# Patient Record
Sex: FEMALE | Race: WHITE | NOT HISPANIC OR LATINO | Employment: OTHER | URBAN - METROPOLITAN AREA
[De-identification: names, ages, dates, MRNs, and addresses within clinical notes are randomized per-mention and may not be internally consistent; named-entity substitution may affect disease eponyms.]

---

## 2020-05-21 ENCOUNTER — HOSPITAL ENCOUNTER (OUTPATIENT)
Facility: HOSPITAL | Age: 80
Setting detail: OBSERVATION
Discharge: HOME | End: 2020-05-22
Attending: INTERNAL MEDICINE | Admitting: INTERNAL MEDICINE
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
## 2020-05-21 PROBLEM — I10 ESSENTIAL HYPERTENSION: Status: ACTIVE | Noted: 2020-05-21

## 2020-05-21 PROBLEM — R94.39 ABNORMAL NUCLEAR STRESS TEST: Status: ACTIVE | Noted: 2020-05-21

## 2020-05-21 PROBLEM — F03.90 DEMENTIA (HCC): Status: ACTIVE | Noted: 2020-05-21

## 2020-05-21 PROBLEM — Z86.73 HISTORY OF CVA (CEREBROVASCULAR ACCIDENT): Status: ACTIVE | Noted: 2020-05-21

## 2020-05-21 PROBLEM — Z72.0 TOBACCO ABUSE: Status: ACTIVE | Noted: 2020-05-21

## 2020-05-21 PROBLEM — E78.5 HYPERLIPIDEMIA: Status: ACTIVE | Noted: 2020-05-21

## 2020-05-21 PROBLEM — N18.30 CKD (CHRONIC KIDNEY DISEASE) STAGE 3, GFR 30-59 ML/MIN (HCC): Status: ACTIVE | Noted: 2020-05-21

## 2020-05-21 PROBLEM — K21.9 GERD (GASTROESOPHAGEAL REFLUX DISEASE): Status: ACTIVE | Noted: 2020-05-21

## 2020-05-21 PROBLEM — I95.1 ORTHOSTASIS: Status: ACTIVE | Noted: 2020-05-21

## 2020-05-21 PROBLEM — I25.10 CAD (CORONARY ARTERY DISEASE): Status: ACTIVE | Noted: 2020-05-21

## 2020-05-21 LAB
ALBUMIN SERPL-MCNC: 3.9 G/DL (ref 3.5–5.2)
ALBUMIN/GLOB SERPL: 1.7 G/DL
ALP SERPL-CCNC: 58 U/L (ref 39–117)
ALT SERPL W P-5'-P-CCNC: 8 U/L (ref 1–33)
ANION GAP SERPL CALCULATED.3IONS-SCNC: 14 MMOL/L (ref 5–15)
AST SERPL-CCNC: 12 U/L (ref 1–32)
BASOPHILS # BLD AUTO: 0.04 10*3/MM3 (ref 0–0.2)
BASOPHILS NFR BLD AUTO: 0.5 % (ref 0–1.5)
BILIRUB SERPL-MCNC: 0.2 MG/DL (ref 0.2–1.2)
BUN BLD-MCNC: 27 MG/DL (ref 8–23)
BUN/CREAT SERPL: 18.6 (ref 7–25)
CALCIUM SPEC-SCNC: 8.8 MG/DL (ref 8.6–10.5)
CHLORIDE SERPL-SCNC: 102 MMOL/L (ref 98–107)
CO2 SERPL-SCNC: 22 MMOL/L (ref 22–29)
CREAT BLD-MCNC: 1.45 MG/DL (ref 0.57–1)
DEPRECATED RDW RBC AUTO: 43.9 FL (ref 37–54)
EOSINOPHIL # BLD AUTO: 0.2 10*3/MM3 (ref 0–0.4)
EOSINOPHIL NFR BLD AUTO: 2.4 % (ref 0.3–6.2)
ERYTHROCYTE [DISTWIDTH] IN BLOOD BY AUTOMATED COUNT: 13.2 % (ref 12.3–15.4)
GFR SERPL CREATININE-BSD FRML MDRD: 35 ML/MIN/1.73
GLOBULIN UR ELPH-MCNC: 2.3 GM/DL
GLUCOSE BLD-MCNC: 81 MG/DL (ref 65–99)
HCT VFR BLD AUTO: 36.8 % (ref 34–46.6)
HGB BLD-MCNC: 11.7 G/DL (ref 12–15.9)
IMM GRANULOCYTES # BLD AUTO: 0.03 10*3/MM3 (ref 0–0.05)
IMM GRANULOCYTES NFR BLD AUTO: 0.4 % (ref 0–0.5)
LYMPHOCYTES # BLD AUTO: 1.57 10*3/MM3 (ref 0.7–3.1)
LYMPHOCYTES NFR BLD AUTO: 19.2 % (ref 19.6–45.3)
MCH RBC QN AUTO: 28.9 PG (ref 26.6–33)
MCHC RBC AUTO-ENTMCNC: 31.8 G/DL (ref 31.5–35.7)
MCV RBC AUTO: 90.9 FL (ref 79–97)
MONOCYTES # BLD AUTO: 0.63 10*3/MM3 (ref 0.1–0.9)
MONOCYTES NFR BLD AUTO: 7.7 % (ref 5–12)
NEUTROPHILS # BLD AUTO: 5.72 10*3/MM3 (ref 1.7–7)
NEUTROPHILS NFR BLD AUTO: 69.8 % (ref 42.7–76)
NRBC BLD AUTO-RTO: 0 /100 WBC (ref 0–0.2)
PLATELET # BLD AUTO: 264 10*3/MM3 (ref 140–450)
PMV BLD AUTO: 9.7 FL (ref 6–12)
POTASSIUM BLD-SCNC: 4.3 MMOL/L (ref 3.5–5.2)
PROT SERPL-MCNC: 6.2 G/DL (ref 6–8.5)
RBC # BLD AUTO: 4.05 10*6/MM3 (ref 3.77–5.28)
SARS-COV-2 RNA RESP QL NAA+PROBE: NOT DETECTED
SODIUM BLD-SCNC: 138 MMOL/L (ref 136–145)
WBC NRBC COR # BLD: 8.19 10*3/MM3 (ref 3.4–10.8)

## 2020-05-21 PROCEDURE — 63710000001 SENNOSIDES-DOCUSATE 8.6-50 MG TABLET: Performed by: PHYSICIAN ASSISTANT

## 2020-05-21 PROCEDURE — 63710000001 ATORVASTATIN 40 MG TABLET: Performed by: PHYSICIAN ASSISTANT

## 2020-05-21 PROCEDURE — 94799 UNLISTED PULMONARY SVC/PX: CPT

## 2020-05-21 PROCEDURE — 25010000002 ENOXAPARIN PER 10 MG: Performed by: PHYSICIAN ASSISTANT

## 2020-05-21 PROCEDURE — 94640 AIRWAY INHALATION TREATMENT: CPT

## 2020-05-21 PROCEDURE — A9270 NON-COVERED ITEM OR SERVICE: HCPCS | Performed by: PHYSICIAN ASSISTANT

## 2020-05-21 PROCEDURE — G0378 HOSPITAL OBSERVATION PER HR: HCPCS

## 2020-05-21 PROCEDURE — 85025 COMPLETE CBC W/AUTO DIFF WBC: CPT | Performed by: PHYSICIAN ASSISTANT

## 2020-05-21 PROCEDURE — 96372 THER/PROPH/DIAG INJ SC/IM: CPT

## 2020-05-21 PROCEDURE — 96360 HYDRATION IV INFUSION INIT: CPT

## 2020-05-21 PROCEDURE — 63710000001 MONTELUKAST 10 MG TABLET: Performed by: PHYSICIAN ASSISTANT

## 2020-05-21 PROCEDURE — 63710000001 PANTOPRAZOLE 40 MG TABLET DELAYED-RELEASE: Performed by: PHYSICIAN ASSISTANT

## 2020-05-21 PROCEDURE — 63710000001 ASPIRIN 81 MG CHEWABLE TABLET: Performed by: PHYSICIAN ASSISTANT

## 2020-05-21 PROCEDURE — 96361 HYDRATE IV INFUSION ADD-ON: CPT

## 2020-05-21 PROCEDURE — 87635 SARS-COV-2 COVID-19 AMP PRB: CPT | Performed by: INTERNAL MEDICINE

## 2020-05-21 PROCEDURE — 63710000001 DOCUSATE SODIUM 100 MG CAPSULE: Performed by: PHYSICIAN ASSISTANT

## 2020-05-21 PROCEDURE — 80053 COMPREHEN METABOLIC PANEL: CPT | Performed by: PHYSICIAN ASSISTANT

## 2020-05-21 PROCEDURE — 99220 PR INITIAL OBSERVATION CARE/DAY 70 MINUTES: CPT | Performed by: INTERNAL MEDICINE

## 2020-05-21 PROCEDURE — 63710000001 ISOSORBIDE MONONITRATE 30 MG TABLET SUSTAINED-RELEASE 24 HOUR: Performed by: PHYSICIAN ASSISTANT

## 2020-05-21 PROCEDURE — C9803 HOPD COVID-19 SPEC COLLECT: HCPCS

## 2020-05-21 PROCEDURE — 63710000001 METOPROLOL TARTRATE 12.5 MG TABLET: Performed by: PHYSICIAN ASSISTANT

## 2020-05-21 PROCEDURE — 63710000001 OLANZAPINE 5 MG TABLET: Performed by: PHYSICIAN ASSISTANT

## 2020-05-21 PROCEDURE — 63710000001 BUDESONIDE-FORMOTEROL 160-4.5 MCG/ACT AEROSOL 6 G INHALER: Performed by: PHYSICIAN ASSISTANT

## 2020-05-21 PROCEDURE — 63710000001 ACETAMINOPHEN 325 MG TABLET: Performed by: PHYSICIAN ASSISTANT

## 2020-05-21 RX ORDER — NITROGLYCERIN 0.4 MG/1
0.4 TABLET SUBLINGUAL
Status: DISCONTINUED | OUTPATIENT
Start: 2020-05-21 | End: 2020-05-22 | Stop reason: HOSPADM

## 2020-05-21 RX ORDER — DOCUSATE CALCIUM 240 MG
240 CAPSULE ORAL 2 TIMES DAILY
COMMUNITY

## 2020-05-21 RX ORDER — SODIUM CHLORIDE 0.9 % (FLUSH) 0.9 %
10 SYRINGE (ML) INJECTION EVERY 12 HOURS SCHEDULED
Status: DISCONTINUED | OUTPATIENT
Start: 2020-05-21 | End: 2020-05-22 | Stop reason: HOSPADM

## 2020-05-21 RX ORDER — CETIRIZINE HYDROCHLORIDE 5 MG/1
5 TABLET ORAL DAILY
COMMUNITY

## 2020-05-21 RX ORDER — ASPIRIN 81 MG/1
324 TABLET, CHEWABLE ORAL ONCE
Status: COMPLETED | OUTPATIENT
Start: 2020-05-21 | End: 2020-05-21

## 2020-05-21 RX ORDER — ACETAMINOPHEN 160 MG/5ML
650 SOLUTION ORAL EVERY 4 HOURS PRN
Status: DISCONTINUED | OUTPATIENT
Start: 2020-05-21 | End: 2020-05-22 | Stop reason: HOSPADM

## 2020-05-21 RX ORDER — ONDANSETRON 2 MG/ML
4 INJECTION INTRAMUSCULAR; INTRAVENOUS EVERY 6 HOURS PRN
Status: DISCONTINUED | OUTPATIENT
Start: 2020-05-21 | End: 2020-05-22 | Stop reason: HOSPADM

## 2020-05-21 RX ORDER — FLUOXETINE HYDROCHLORIDE 20 MG/1
20 CAPSULE ORAL DAILY
Status: DISCONTINUED | OUTPATIENT
Start: 2020-05-22 | End: 2020-05-22 | Stop reason: HOSPADM

## 2020-05-21 RX ORDER — FAMOTIDINE 20 MG/1
20 TABLET, FILM COATED ORAL DAILY
Status: DISCONTINUED | OUTPATIENT
Start: 2020-05-22 | End: 2020-05-22 | Stop reason: HOSPADM

## 2020-05-21 RX ORDER — CLOPIDOGREL BISULFATE 75 MG/1
75 TABLET ORAL DAILY
Status: DISCONTINUED | OUTPATIENT
Start: 2020-05-21 | End: 2020-05-21

## 2020-05-21 RX ORDER — BUDESONIDE AND FORMOTEROL FUMARATE DIHYDRATE 160; 4.5 UG/1; UG/1
2 AEROSOL RESPIRATORY (INHALATION)
COMMUNITY

## 2020-05-21 RX ORDER — LISINOPRIL 5 MG/1
5 TABLET ORAL DAILY
Status: DISCONTINUED | OUTPATIENT
Start: 2020-05-21 | End: 2020-05-22 | Stop reason: HOSPADM

## 2020-05-21 RX ORDER — ASPIRIN 325 MG
325 TABLET ORAL DAILY
Status: DISCONTINUED | OUTPATIENT
Start: 2020-05-21 | End: 2020-05-21

## 2020-05-21 RX ORDER — FLUOXETINE HYDROCHLORIDE 60 MG/1
60 TABLET, FILM COATED ORAL; ORAL DAILY
COMMUNITY

## 2020-05-21 RX ORDER — LISINOPRIL 10 MG/1
10 TABLET ORAL DAILY
Status: ON HOLD | COMMUNITY
End: 2020-05-22 | Stop reason: SDUPTHER

## 2020-05-21 RX ORDER — CLOPIDOGREL BISULFATE 75 MG/1
75 TABLET ORAL DAILY
Status: DISCONTINUED | OUTPATIENT
Start: 2020-05-21 | End: 2020-05-22 | Stop reason: HOSPADM

## 2020-05-21 RX ORDER — ASPIRIN 81 MG/1
81 TABLET ORAL DAILY
Status: DISCONTINUED | OUTPATIENT
Start: 2020-05-22 | End: 2020-05-22 | Stop reason: HOSPADM

## 2020-05-21 RX ORDER — ALUMINA, MAGNESIA, AND SIMETHICONE 2400; 2400; 240 MG/30ML; MG/30ML; MG/30ML
15 SUSPENSION ORAL EVERY 6 HOURS PRN
Status: DISCONTINUED | OUTPATIENT
Start: 2020-05-21 | End: 2020-05-22 | Stop reason: HOSPADM

## 2020-05-21 RX ORDER — BUDESONIDE AND FORMOTEROL FUMARATE DIHYDRATE 160; 4.5 UG/1; UG/1
2 AEROSOL RESPIRATORY (INHALATION)
Status: DISCONTINUED | OUTPATIENT
Start: 2020-05-21 | End: 2020-05-22 | Stop reason: HOSPADM

## 2020-05-21 RX ORDER — FAMOTIDINE 10 MG
10 TABLET ORAL DAILY
Status: ON HOLD | COMMUNITY
End: 2020-05-21

## 2020-05-21 RX ORDER — ONDANSETRON 4 MG/1
4 TABLET, FILM COATED ORAL EVERY 8 HOURS PRN
Status: DISCONTINUED | OUTPATIENT
Start: 2020-05-21 | End: 2020-05-21 | Stop reason: SDUPTHER

## 2020-05-21 RX ORDER — LISINOPRIL 10 MG/1
10 TABLET ORAL DAILY
Status: DISCONTINUED | OUTPATIENT
Start: 2020-05-21 | End: 2020-05-21

## 2020-05-21 RX ORDER — MONTELUKAST SODIUM 10 MG/1
10 TABLET ORAL NIGHTLY
Status: DISCONTINUED | OUTPATIENT
Start: 2020-05-21 | End: 2020-05-22 | Stop reason: HOSPADM

## 2020-05-21 RX ORDER — CETIRIZINE HYDROCHLORIDE 10 MG/1
5 TABLET ORAL DAILY
Status: DISCONTINUED | OUTPATIENT
Start: 2020-05-22 | End: 2020-05-22 | Stop reason: HOSPADM

## 2020-05-21 RX ORDER — ASPIRIN 325 MG
325 TABLET ORAL DAILY
COMMUNITY
End: 2020-05-22 | Stop reason: HOSPADM

## 2020-05-21 RX ORDER — SODIUM CHLORIDE 9 MG/ML
100 INJECTION, SOLUTION INTRAVENOUS CONTINUOUS
Status: DISCONTINUED | OUTPATIENT
Start: 2020-05-21 | End: 2020-05-22 | Stop reason: HOSPADM

## 2020-05-21 RX ORDER — CLOPIDOGREL BISULFATE 75 MG/1
75 TABLET ORAL DAILY
COMMUNITY
End: 2022-11-15

## 2020-05-21 RX ORDER — ACETAMINOPHEN 325 MG/1
650 TABLET ORAL EVERY 4 HOURS PRN
Status: DISCONTINUED | OUTPATIENT
Start: 2020-05-21 | End: 2020-05-22 | Stop reason: HOSPADM

## 2020-05-21 RX ORDER — ONDANSETRON 4 MG/1
4 TABLET, FILM COATED ORAL EVERY 6 HOURS PRN
Status: DISCONTINUED | OUTPATIENT
Start: 2020-05-21 | End: 2020-05-22 | Stop reason: HOSPADM

## 2020-05-21 RX ORDER — SIMVASTATIN 40 MG
40 TABLET ORAL NIGHTLY
Status: ON HOLD | COMMUNITY
End: 2020-05-21

## 2020-05-21 RX ORDER — FUROSEMIDE 20 MG/1
20 TABLET ORAL DAILY
Status: ON HOLD | COMMUNITY
End: 2020-05-21

## 2020-05-21 RX ORDER — ISOSORBIDE MONONITRATE 30 MG/1
30 TABLET, EXTENDED RELEASE ORAL
Status: DISCONTINUED | OUTPATIENT
Start: 2020-05-21 | End: 2020-05-22 | Stop reason: HOSPADM

## 2020-05-21 RX ORDER — PANTOPRAZOLE SODIUM 40 MG/1
40 TABLET, DELAYED RELEASE ORAL
Status: DISCONTINUED | OUTPATIENT
Start: 2020-05-21 | End: 2020-05-22 | Stop reason: HOSPADM

## 2020-05-21 RX ORDER — FAMOTIDINE 20 MG/1
20 TABLET, FILM COATED ORAL DAILY
Status: DISCONTINUED | OUTPATIENT
Start: 2020-05-21 | End: 2020-05-21

## 2020-05-21 RX ORDER — AMOXICILLIN 250 MG
2 CAPSULE ORAL 2 TIMES DAILY PRN
Status: DISCONTINUED | OUTPATIENT
Start: 2020-05-21 | End: 2020-05-22 | Stop reason: HOSPADM

## 2020-05-21 RX ORDER — CHLORTHALIDONE 25 MG/1
12.5 TABLET ORAL DAILY
Status: ON HOLD | COMMUNITY
End: 2020-05-21

## 2020-05-21 RX ORDER — HYDROCODONE BITARTRATE AND ACETAMINOPHEN 5; 325 MG/1; MG/1
1 TABLET ORAL EVERY 8 HOURS PRN
Status: ON HOLD | COMMUNITY
End: 2020-05-21

## 2020-05-21 RX ORDER — ONDANSETRON 4 MG/1
4 TABLET, FILM COATED ORAL EVERY 8 HOURS PRN
COMMUNITY

## 2020-05-21 RX ORDER — PANTOPRAZOLE SODIUM 40 MG/1
40 TABLET, DELAYED RELEASE ORAL DAILY
Status: ON HOLD | COMMUNITY
End: 2020-05-21

## 2020-05-21 RX ORDER — MONTELUKAST SODIUM 10 MG/1
10 TABLET ORAL NIGHTLY
COMMUNITY

## 2020-05-21 RX ORDER — FAMOTIDINE 20 MG/1
40 TABLET, FILM COATED ORAL DAILY
Status: DISCONTINUED | OUTPATIENT
Start: 2020-05-21 | End: 2020-05-21

## 2020-05-21 RX ORDER — ACETAMINOPHEN 650 MG/1
650 SUPPOSITORY RECTAL EVERY 4 HOURS PRN
Status: DISCONTINUED | OUTPATIENT
Start: 2020-05-21 | End: 2020-05-22 | Stop reason: HOSPADM

## 2020-05-21 RX ORDER — SODIUM CHLORIDE 0.9 % (FLUSH) 0.9 %
10 SYRINGE (ML) INJECTION AS NEEDED
Status: DISCONTINUED | OUTPATIENT
Start: 2020-05-21 | End: 2020-05-22 | Stop reason: HOSPADM

## 2020-05-21 RX ORDER — OLANZAPINE 5 MG/1
10 TABLET ORAL NIGHTLY
COMMUNITY
End: 2022-04-19

## 2020-05-21 RX ORDER — DOCUSATE SODIUM 100 MG/1
100 CAPSULE, LIQUID FILLED ORAL 2 TIMES DAILY PRN
Status: DISCONTINUED | OUTPATIENT
Start: 2020-05-21 | End: 2020-05-22 | Stop reason: HOSPADM

## 2020-05-21 RX ORDER — FAMOTIDINE 20 MG/1
20 TABLET, FILM COATED ORAL DAILY
COMMUNITY
End: 2020-05-22 | Stop reason: HOSPADM

## 2020-05-21 RX ORDER — ATORVASTATIN CALCIUM 40 MG/1
80 TABLET, FILM COATED ORAL NIGHTLY
Status: DISCONTINUED | OUTPATIENT
Start: 2020-05-21 | End: 2020-05-22 | Stop reason: HOSPADM

## 2020-05-21 RX ORDER — OLANZAPINE 5 MG/1
5 TABLET ORAL NIGHTLY
Status: DISCONTINUED | OUTPATIENT
Start: 2020-05-21 | End: 2020-05-22 | Stop reason: HOSPADM

## 2020-05-21 RX ADMIN — ASPIRIN 81 MG 324 MG: 81 TABLET ORAL at 13:39

## 2020-05-21 RX ADMIN — IPRATROPIUM BROMIDE 0.5 MG: 0.5 SOLUTION RESPIRATORY (INHALATION) at 16:03

## 2020-05-21 RX ADMIN — BUDESONIDE AND FORMOTEROL FUMARATE DIHYDRATE 2 PUFF: 160; 4.5 AEROSOL RESPIRATORY (INHALATION) at 19:12

## 2020-05-21 RX ADMIN — SODIUM CHLORIDE 100 ML/HR: 9 INJECTION, SOLUTION INTRAVENOUS at 16:45

## 2020-05-21 RX ADMIN — SENNOSIDES AND DOCUSATE SODIUM 2 TABLET: 8.6; 5 TABLET ORAL at 20:35

## 2020-05-21 RX ADMIN — ATORVASTATIN CALCIUM 80 MG: 40 TABLET, FILM COATED ORAL at 20:35

## 2020-05-21 RX ADMIN — MONTELUKAST SODIUM 10 MG: 10 TABLET, FILM COATED ORAL at 20:36

## 2020-05-21 RX ADMIN — SODIUM CHLORIDE, PRESERVATIVE FREE 10 ML: 5 INJECTION INTRAVENOUS at 14:33

## 2020-05-21 RX ADMIN — OLANZAPINE 5 MG: 5 TABLET, FILM COATED ORAL at 20:36

## 2020-05-21 RX ADMIN — ENOXAPARIN SODIUM 40 MG: 40 INJECTION SUBCUTANEOUS at 13:39

## 2020-05-21 RX ADMIN — PANTOPRAZOLE SODIUM 40 MG: 40 TABLET, DELAYED RELEASE ORAL at 16:45

## 2020-05-21 RX ADMIN — ACETAMINOPHEN 650 MG: 325 TABLET, FILM COATED ORAL at 22:47

## 2020-05-21 RX ADMIN — METOPROLOL TARTRATE 12.5 MG: 25 TABLET, FILM COATED ORAL at 13:39

## 2020-05-21 RX ADMIN — SODIUM CHLORIDE 500 ML: 9 INJECTION, SOLUTION INTRAVENOUS at 14:30

## 2020-05-21 RX ADMIN — ISOSORBIDE MONONITRATE 30 MG: 30 TABLET, EXTENDED RELEASE ORAL at 13:39

## 2020-05-21 RX ADMIN — DOCUSATE SODIUM 100 MG: 100 CAPSULE, LIQUID FILLED ORAL at 20:35

## 2020-05-21 RX ADMIN — IPRATROPIUM BROMIDE 0.5 MG: 0.5 SOLUTION RESPIRATORY (INHALATION) at 19:11

## 2020-05-21 NOTE — PLAN OF CARE
Problem: Patient Care Overview  Goal: Plan of Care Review  Outcome: Ongoing (interventions implemented as appropriate)  Flowsheets (Taken 5/21/2020 1723)  Progress: no change  Plan of Care Reviewed With: patient  Outcome Summary: No c/o chest pain since Pt arrived to unit. NS infusing at 100. RA. NSR. VSS. Patient ambulated to bathroom with standby assist. Will continue to monitor.

## 2020-05-21 NOTE — H&P
[facility]        Date of Hospital Visit: 20      Place of Service: [facility]    Patient Name: Genoveva Velázquez  :1940    Referral Provider: Azeem Rivera MD  Primary Care Provider: Honey Newsome MD    Chief complaint/Reason for Consultation:  Coronary Artery Disease         Problem List:  Patient Active Problem List    Diagnosis    • Falls         • CAD (coronary artery disease)                · H/O stents  · Echo, 2020: Normal LV systolic function EF greater than 60%.  Normal valvular morphology.     • Abnormal nuclear stress test                · MPS 20: Small to medium sized, mild to moderately severe anterior wall defect possibly representing chronic, low risk ischemia in the LAD territory.  No new ischemia.  Normal LV systolic function.  No significant change or new abnormality compared with 3 previous nuclear stress test     • Orthostasis         • Essential hypertension         • Hyperlipidemia         • Tobacco abuse         • History of CVA (cerebrovascular accident)    • GERD (gastroesophageal reflux disease)    • CKD (chronic kidney disease) stage 3, GFR 30-59 ml/min (CMS/[facility])    • Dementia (CMS/[facility])                History of Present Illness:  This is an 79-year-old hypertensive dyslipidemic female smoker with known coronary artery disease with remote stent to unknown vessel.  She recently presented to [facility] emergency department with a complaint of 2 falls over the previous 3 days.  She denies alf syncope.  MI was excluded.  She had orthostatic blood pressures which were positive for orthostasis.  At the time of admission she was on Lasix 20 mg daily as well as chlorthalidone 25 mg half tablet daily.  Chlorthalidone was discontinued this past Tuesday.  She had an echocardiogram which was unremarkable and a myocardial perfusion study which was abnormal but unchanged from the previous 3 exams.  Last night she had a  "single episode of sharp chest pain lasting approximately 15 minutes which resolved without intervention but was associated with \"numbness of the mouth\" and pain in the left shoulder \"like a toothache\".  At the time of admission to [facility] she was on 2 separate diuretics 1 of which was discontinued.  She has stage III chronic kidney disease which is stable.  She denies orthopnea, PND, claudication, lower extremity edema.  She has no awareness of tachyarrhythmias, no dizziness or syncope.        History reviewed. No pertinent surgical history.    Allergies   Allergen Reactions   • Sulfa Antibiotics Unknown - High Severity     Per patient, mother told her not to take       No medications prior to admission.   Cardiac medications prior to admission:  1.  Lisinopril 10 mg daily  2.  Simvastatin 80 mg daily  3.  Plavix 75 mg daily  4.  Lasix 20 mg daily  5.  Imdur 30 mg daily  6.  Chlorthalidone 25 mg half tablet daily  7.  Nitroglycerin 0.4 sublingual as needed chest pain    Noncardiac medications prior to admission:  1.  Hydrocodone 5-3 25 1 tablet every 8 hours as needed  2. Pantoprazole 40 mg daily  3. Fluoxetine 40 mg daily  4. Albuterol 2 inhalations 6 hours as needed  5. Singulair 10 mg daily  6. Calcium 600 mg daily next poly-iron 150 mg twice daily  7. Cetirizine 10 mg daily make stool softener 1 daily   8. famotidine 20 mg daily    9.  Zofran 4 mg twice daily as needed   10. Symbicort 160-4.5 mg 2 inhalations twice daily next   11. olanzapine 10 mg at bedtime  12 Spiriva 18 mcg 1 inhalation elation daily    No current facility-administered medications for this encounter.       Social History     Socioeconomic History   • Marital status:      Spouse name: Not on file   • Number of children: Not on file   • Years of education: Not on file   • Highest education level: Not on file   Tobacco Use   • Smoking status: Current Every Day Smoker     Packs/day: 1.00     Types: Cigarettes   • Smokeless " "tobacco: Never Used   Substance and Sexual Activity   • Alcohol use: Never     Frequency: Never   • Drug use: Never   • Sexual activity: Defer       History reviewed. No pertinent family history.    REVIEW OF SYSTEMS:   Review of Systems   Constitution: Negative.   HENT: Negative.    Eyes: Negative.    Cardiovascular: Positive for chest pain.   Respiratory: Negative.    Endocrine: Negative.    Hematologic/Lymphatic: Negative.    Skin: Negative.    Musculoskeletal: Negative.    Gastrointestinal: Negative.    Genitourinary: Negative.    Neurological: Negative.    Psychiatric/Behavioral: Negative.    Allergic/Immunologic: Negative.    All other systems reviewed and are negative.           Objective:  Vitals:    05/21/20 1055 05/21/20 1100   BP: 109/62 119/81   BP Location: Left arm Right arm   Patient Position: Lying Lying   Pulse: 79 78   Resp: 18    Temp: 97.6 °F (36.4 °C)    TempSrc: Oral    SpO2: 93% 92%   Weight: 75.3 kg (166 lb)    Height: 162.6 cm (64\")      Body mass index is 28.49 kg/m².  Flowsheet Rows      First Filed Value   Admission Height  162.6 cm (64\") Documented at 05/21/2020 1055   Admission Weight  75.3 kg (166 lb) Documented at 05/21/2020 1055        No intake or output data in the 24 hours ending 05/21/20 1121    Physical Exam   Constitutional: She is oriented to person, place, and time. She appears well-developed and well-nourished. No distress.   HENT:   Head: Normocephalic and atraumatic.   Mouth/Throat: Oropharynx is clear and moist.   Eyes: Pupils are equal, round, and reactive to light. No scleral icterus.   Neck: Neck supple. No JVD present. No tracheal deviation present. No thyromegaly present.   Cardiovascular: Normal rate, regular rhythm and normal heart sounds. Exam reveals no gallop and no friction rub.   No murmur heard.  Pulmonary/Chest: Effort normal and breath sounds normal. No respiratory distress. She has no wheezes. She has no rales.   Abdominal: Soft. Bowel sounds are normal. " "She exhibits no distension and no mass. There is no tenderness. There is no rebound and no guarding.   Musculoskeletal: Normal range of motion. She exhibits no edema or deformity.   Lymphadenopathy:     She has no cervical adenopathy.   Neurological: She is alert and oriented to person, place, and time. No cranial nerve deficit.   Skin: Skin is warm and dry. No rash noted. She is not diaphoretic.   Psychiatric: She has a normal mood and affect.   Nursing note and vitals reviewed.                Lab Review: From [facility]:  CMP, 5/21/2020: Glu 106, BUN 28, scr 1.49, GFR 34, , K 4.2, chl 105, CO2 26, bili 0.3, alk phos 56, albumin 3.8, globulin 2.5, protein 6.3, ALT 11, AST 17, calcium 9  Troponin: 0.012×5  BNP: 70  CBC: WBCs 5.6, hemoglobin 12.1, hematocrit 35.3, platelets 254                           Assessment and Plan:     1.  Atypical chest pain, MI excluded  2.  Known coronary artery disease with abnormal myocardial perfusion study unchanged from baseline  3.  Falls most probably secondary to orthostasis due to overdiuresis  4.  Hypertension, well managed  5.  Dyslipidemia on high-dose statin  6.  Chronic kidney disease stage III, stable        Plan:  1.  Continue medical treatment  2.  Change Protonix to 40 mg twice daily  3.  Add Lopressor 12.5 mg twice daily  4.  Gentle volume replacement  5.  Diet       Electronically signed by MURIEL Gutiérrez, 05/21/20, 11:34 AM.    ___________________________________________________________  The patient was seen and examined by me.  Agree and verified/discussed key components of E/M as outlined by the Nurse practitioner/PA.    /81 (BP Location: Right arm, Patient Position: Lying)   Pulse 78   Temp 97.6 °F (36.4 °C) (Oral)   Resp 18   Ht 162.6 cm (64\")   Wt 75.3 kg (166 lb)   SpO2 92%   BMI 28.49 kg/m²     General Appearance:   · well developed  · well nourished  Neck:  · thyroid not enlarged  · supple  Respiratory:  · no " respiratory distress  · normal breath sounds  · no rales  Cardiovascular:  · no jugular venous distention  · regular rhythm  · apical impulse normal  · S1 normal, S2 normal  · no S3, no S4   · no murmur  · no rub, no thrill  · carotid pulses normal; no bruit  · pedal pulses normal  · lower extremity edema: none  .   Skin:   · warm, dry        Plan:    I had a long discussion with the patient regarding her chest pain and other symptoms.  Symptoms have mixed features of typical and atypical features  Negative troponin, no acute EKG changes, stress test and echocardiogram report personally reviewed both unchanged from previous  She denies exertional angina.  I discussed all possible treatment options including cardiac catheterization or medical therapy and monitoring  She chose medical therapy and monitoring, she does not want to undergo cardiac catheterization at this time.  Increase Protonix to 40 mg twice daily, refer for GI evaluation she has a history of esophageal problems in the past.  Start low-dose metoprolol 12.5 mg twice daily  Monitor diuretics closely, discontinue if she continues to have orthostatic hypotension  We will monitor overnight, she becomes unstable or has worsening chest pain will proceed with coronary angiography if she is agreeable.    Azeem Rivera MD, [facility]

## 2020-05-21 NOTE — NURSING NOTE
"  ACC REVIEW REPORT: [facility]        PATIENT NAME: Genoveva Velázquez    PATIENT ID: [id number]        COVID-19 ACC SCREENING       DOES THE PATIENT HAVE A FEVER GREATER THAN OR EQUAL .4: no    IS THE PATIENT EXPERIENCING SHORTNESS OF BREATH: no    DOES THE PATIENT HAVE A COUGH: no    DOES THE PATIENT HAVE ANY OF THE FOLLOWING RISK FACTORS:    EXPOSURE TO SUSPECTED OR KNOWN COVID-19: no    RECENT TRAVEL HISTORY TO ENDEMIC AREA (DOMESTIC/LOCAL): no    IS THE PATIENT A HEALTHCARE WORKER: no    HAS THE PATIENT BEEN TESTED FOR COVID-19: unknown    DATE TESTED:     LAB TESTING SENT TO:           BED: S 501    BED TYPE: telemetry    BED GIVEN TO: Dominga Ricks    TIME BED GIVEN: 0840    YOB: 1940    AGE: 79    GENDER: F    PREVIOUS ADMIT TO [facility]: no    PREVIOUS ADMISSION DATE:     PATIENT CLASS:     TODAY'S DATE: 5/21/2020    TRANSFER DATE: 5-21-20    ETA: after 10:00    TRANSFERRING FACILITY: [facility] PHONE # : [phone]    TRANSFERRING MD: Nahum    DATE/TIME REQUEST RECEIVED: 5-21-20@0800    [facility] RN: Pamela Swanson    REPORT FROM: Dominga Ricks    TIME REPORT TAKEN: 0830    DIAGNOSIS: CP    REASON FOR TRANSFER TO [facility]: higher level of care    TRANSPORTATION: EMS    CLINICAL REASON FOR TRANSFER TO [facility]: pt was admitted to OSH with intermittent CP/fall/syncope - pt had normal carotid doppler, GXT with chronic ischemic changes (reversible), ECHO with hypokinesis  She had recurrent CP 5/20 lasting 15-20\" at rest with left arm numbness without EKG changes & with nl troponin; she is being transferred for a possible heart cath  chlorthalidone discontinued  Pt had \"mild hyponatremia\"      CLINICAL INFORMATION    HEIGHT:     WEIGHT: 166#    ALLERGIES: sulfa    LAMB: no    INFECTIOUS DISEASE:     ISOLATION:     LAST VITAL SIGNS:  TIME: 0600  TEMP: 98  PULSE: 60  B/P: 126/76  RESP: 16    LAB INFORMATION: Cr 1.6--->1.49     CULTURE INFORMATION:     MEDS/IV FLUIDS: bun 28, Cr " 1.49, gfr 34, H&H: 12.1/35.3, plt 254, WBC 5.6, Ua neg, PT 9.9, INR 0.94, PTT 23.9      CARDIAC SYSTEM:    CHEST PAIN: none since last evening    RHYTHM: NSR/PVC's    Is patient taking or has patient been given any drugs that could increase bleeding? yes  (Plavix, Brilinta, Effient, Eliquis, Xarelto, Warfarin, Integrilin, Angiomax)    DRUG: plavix     DOSE/FREQUENCY:     CARDIAC ENZYMES:   troponin x6 - all </= 0.012  Last one checked today at 0705    CPK on 5/18: 174  CKMB on 5/19: 2.6    CARDIAC NOTES: see above      RESPIRATORY SYSTEM:    LUNG SOUNDS:  clear    OXYGEN: no    O2 SAT: 97% on RA    RADIOLOGY RESULTS: CXR - large hiatal hernia, atherosclerosis    RESPIRATORY STATUS: no soa      CNS/MUSCULOSKELETAL    ALERT AND ORIENTED: yes  INJURY:  WHERE: multiple falls prior to admission; no significant injury    CAT SCAN RESULTS: CT head -no new findings    CNS/MUSCULOSKELETAL NOTES: pt has a hx of a CVA with no residual deficit; she has a hx of dementia; she is a&o and ambulatory      GI//GY      ABDOMINAL PAIN: no    VOMITING: no    DIARRHEA: no    NAUSEA: no    BOWEL SOUNDS:     OCCULT STOOL:     VAGINAL BLEEDING:     CT SCAN: yes    CT SCAN RESULTS: no acute findings    GI//GY NOTES: pt has a hx of stress incontinence  Pt had breakfast this morning    PAST MEDICAL HISTORY: CAD/stents (last one 2009), CKD    ADDITIONAL NOTES: Dr Rivera was consulted          Chelsi Swanson, BETTY  5/21/2020  08:09

## 2020-05-22 VITALS
OXYGEN SATURATION: 97 % | BODY MASS INDEX: 28.34 KG/M2 | RESPIRATION RATE: 18 BRPM | WEIGHT: 166 LBS | TEMPERATURE: 97.8 F | DIASTOLIC BLOOD PRESSURE: 98 MMHG | SYSTOLIC BLOOD PRESSURE: 147 MMHG | HEIGHT: 64 IN | HEART RATE: 66 BPM

## 2020-05-22 PROBLEM — R07.89 ATYPICAL CHEST PAIN: Status: ACTIVE | Noted: 2020-05-22

## 2020-05-22 LAB
ANION GAP SERPL CALCULATED.3IONS-SCNC: 13 MMOL/L (ref 5–15)
BUN BLD-MCNC: 27 MG/DL (ref 8–23)
BUN/CREAT SERPL: 18.6 (ref 7–25)
CALCIUM SPEC-SCNC: 8 MG/DL (ref 8.6–10.5)
CHLORIDE SERPL-SCNC: 107 MMOL/L (ref 98–107)
CHOLEST SERPL-MCNC: 141 MG/DL (ref 0–200)
CO2 SERPL-SCNC: 21 MMOL/L (ref 22–29)
CREAT BLD-MCNC: 1.45 MG/DL (ref 0.57–1)
GFR SERPL CREATININE-BSD FRML MDRD: 35 ML/MIN/1.73
GLUCOSE BLD-MCNC: 91 MG/DL (ref 65–99)
HBA1C MFR BLD: 5.7 % (ref 4.8–5.6)
HDLC SERPL-MCNC: 56 MG/DL (ref 40–60)
LDLC SERPL CALC-MCNC: 65 MG/DL (ref 0–100)
LDLC/HDLC SERPL: 1.16 {RATIO}
POTASSIUM BLD-SCNC: 3.9 MMOL/L (ref 3.5–5.2)
SODIUM BLD-SCNC: 141 MMOL/L (ref 136–145)
TRIGL SERPL-MCNC: 100 MG/DL (ref 0–150)
VLDLC SERPL-MCNC: 20 MG/DL

## 2020-05-22 PROCEDURE — 80048 BASIC METABOLIC PNL TOTAL CA: CPT | Performed by: PHYSICIAN ASSISTANT

## 2020-05-22 PROCEDURE — G0378 HOSPITAL OBSERVATION PER HR: HCPCS

## 2020-05-22 PROCEDURE — 99217 PR OBSERVATION CARE DISCHARGE MANAGEMENT: CPT | Performed by: INTERNAL MEDICINE

## 2020-05-22 PROCEDURE — 63710000001 PANTOPRAZOLE 40 MG TABLET DELAYED-RELEASE: Performed by: PHYSICIAN ASSISTANT

## 2020-05-22 PROCEDURE — 80061 LIPID PANEL: CPT | Performed by: PHYSICIAN ASSISTANT

## 2020-05-22 PROCEDURE — A9270 NON-COVERED ITEM OR SERVICE: HCPCS | Performed by: PHYSICIAN ASSISTANT

## 2020-05-22 PROCEDURE — 94799 UNLISTED PULMONARY SVC/PX: CPT

## 2020-05-22 PROCEDURE — 83036 HEMOGLOBIN GLYCOSYLATED A1C: CPT | Performed by: PHYSICIAN ASSISTANT

## 2020-05-22 RX ORDER — ISOSORBIDE MONONITRATE 30 MG/1
30 TABLET, EXTENDED RELEASE ORAL
Qty: 30 TABLET | Refills: 11 | Status: SHIPPED | OUTPATIENT
Start: 2020-05-23 | End: 2020-05-26

## 2020-05-22 RX ORDER — PANTOPRAZOLE SODIUM 40 MG/1
40 TABLET, DELAYED RELEASE ORAL
Qty: 60 TABLET | Refills: 3 | Status: SHIPPED | OUTPATIENT
Start: 2020-05-22 | End: 2020-05-26

## 2020-05-22 RX ORDER — ATORVASTATIN CALCIUM 40 MG/1
40 TABLET, FILM COATED ORAL NIGHTLY
Qty: 30 TABLET | Refills: 11 | Status: SHIPPED | OUTPATIENT
Start: 2020-05-22 | End: 2020-05-26

## 2020-05-22 RX ORDER — LISINOPRIL 5 MG/1
5 TABLET ORAL DAILY
Qty: 30 TABLET | Refills: 11 | Status: SHIPPED | OUTPATIENT
Start: 2020-05-22 | End: 2020-05-26 | Stop reason: SDUPTHER

## 2020-05-22 RX ORDER — NITROGLYCERIN 0.4 MG/1
0.4 TABLET SUBLINGUAL
Qty: 25 TABLET | Refills: 12 | Status: SHIPPED | OUTPATIENT
Start: 2020-05-22 | End: 2020-05-26

## 2020-05-22 RX ORDER — ASPIRIN 81 MG/1
81 TABLET ORAL DAILY
Qty: 90 TABLET | Refills: 3 | Status: SHIPPED | OUTPATIENT
Start: 2020-05-23 | End: 2020-05-26

## 2020-05-22 RX ADMIN — BUDESONIDE AND FORMOTEROL FUMARATE DIHYDRATE 2 PUFF: 160; 4.5 AEROSOL RESPIRATORY (INHALATION) at 06:43

## 2020-05-22 RX ADMIN — LISINOPRIL 5 MG: 5 TABLET ORAL at 08:56

## 2020-05-22 RX ADMIN — FAMOTIDINE 20 MG: 20 TABLET, FILM COATED ORAL at 08:56

## 2020-05-22 RX ADMIN — ASPIRIN 81 MG: 81 TABLET, COATED ORAL at 08:56

## 2020-05-22 RX ADMIN — METOPROLOL TARTRATE 12.5 MG: 25 TABLET, FILM COATED ORAL at 08:56

## 2020-05-22 RX ADMIN — PANTOPRAZOLE SODIUM 40 MG: 40 TABLET, DELAYED RELEASE ORAL at 06:01

## 2020-05-22 RX ADMIN — IPRATROPIUM BROMIDE 0.5 MG: 0.5 SOLUTION RESPIRATORY (INHALATION) at 11:32

## 2020-05-22 RX ADMIN — FLUOXETINE HYDROCHLORIDE 20 MG: 20 CAPSULE ORAL at 08:56

## 2020-05-22 RX ADMIN — CLOPIDOGREL BISULFATE 75 MG: 75 TABLET ORAL at 08:56

## 2020-05-22 RX ADMIN — SODIUM CHLORIDE 100 ML/HR: 9 INJECTION, SOLUTION INTRAVENOUS at 06:01

## 2020-05-22 RX ADMIN — CETIRIZINE HYDROCHLORIDE 5 MG: 10 TABLET, FILM COATED ORAL at 08:56

## 2020-05-22 RX ADMIN — ISOSORBIDE MONONITRATE 30 MG: 30 TABLET, EXTENDED RELEASE ORAL at 09:03

## 2020-05-22 RX ADMIN — IPRATROPIUM BROMIDE 0.5 MG: 0.5 SOLUTION RESPIRATORY (INHALATION) at 06:43

## 2020-05-22 NOTE — DISCHARGE SUMMARY
"  Date of Discharge:  5/22/2020    Discharge Diagnosis: Chest pain    Presenting Problem/History of Present Illness  Chest pain [R07.9]  Atypical chest pain [R07.89]  Orthostatic hypotension  GERD  Falls    Hospital Course  Patient is a 79 y.o. female presented with history of CAD, hypertension, hyperlipidemia, GERD, hiatal hernia, presenting to [facility] with falls.  Concern for syncope but she was actually found to be orthostatic.  She was on 2 diuretics at home both were stopped.  She also underwent an echocardiogram and a stress test the stress test showed a partially reversible defect in the anterior lateral zone, unchanged from previous.  She was transferred to [facility] for consideration of cardiac catheterization.  However upon further discussion with the patient her symptoms are quite atypical, and she did not want to pursue a cardiac catheterization at this time.  Therefore we change famotidine to pantoprazole 40 mg twice daily, and I would recommend a GI work-up in the near future.  Diuretics were stopped, Imdur and metoprolol were started, she tolerated these medications well and was chest pain-free on day of discharge.    Procedures Performed  Procedure(s):  LEFT HEART CATH       Consults:   Consults     No orders found for last 30 day(s).          Pertinent Test Results:     Ejection Fraction  No results found for: EF    Echo EF Estimated  No results found for: ECHOEFEST    Nuclear Stress Ejection Fraction  No components found for: NUCEF    Cath Ejection Fraction Quantitative  No results found for: CATHEF    Condition on Discharge: Stable    Physical Exam at Discharge    Vital Signs  /98 (BP Location: Left arm, Patient Position: Lying)   Pulse 73   Temp 97.8 °F (36.6 °C) (Oral)   Resp 16   Ht 162.6 cm (64\")   Wt 75.3 kg (166 lb)   SpO2 94%   BMI 28.49 kg/m²       Physical Exam:  General Appearance:   · well developed  · well nourished  HENT:   · oropharynx moist  · lips not " cyanotic  Neck:  · thyroid not enlarged  · supple  Respiratory:  · no respiratory distress  · normal breath sounds  · no rales  Cardiovascular:  · no jugular venous distention  · regular rhythm  · apical impulse normal  · S1 normal, S2 normal  · no S3, no S4   · no murmur  · no rub, no thrill  · carotid pulses normal; no bruit  · pedal pulses normal  · lower extremity edema: none    Gastrointestinal:   · bowel sounds normal  · non-tender  · no hepatomegaly, no splenomegaly  Musculoskeletal:  · no clubbing of fingers.   · normocephalic, head atraumatic  Skin:   · warm  · dry  Psychiatric:  · judgement and insight appropriate  · normal mood and affect      Discharge Disposition  Home or Self Care    Discharge Medications     Discharge Medications      New Medications      Instructions Start Date   aspirin 81 MG EC tablet  Replaces:  aspirin 325 MG tablet   81 mg, Oral, Daily   Start Date:  May 23, 2020     atorvastatin 40 MG tablet  Commonly known as:  LIPITOR   40 mg, Oral, Nightly      isosorbide mononitrate 30 MG 24 hr tablet  Commonly known as:  IMDUR   30 mg, Oral, Every 24 Hours Scheduled   Start Date:  May 23, 2020     metoprolol tartrate 25 MG tablet  Commonly known as:  LOPRESSOR   12.5 mg, Oral, Every 12 Hours Scheduled      nitroglycerin 0.4 MG SL tablet  Commonly known as:  NITROSTAT   0.4 mg, Sublingual, Every 5 Minutes PRN, Take no more than 3 doses in 15 minutes.      pantoprazole 40 MG EC tablet  Commonly known as:  PROTONIX   40 mg, Oral, 2 Times Daily Before Meals         Changes to Medications      Instructions Start Date   lisinopril 5 MG tablet  Commonly known as:  PRINIVIL,ZESTRIL  What changed:    · medication strength  · how much to take   5 mg, Oral, Daily         Continue These Medications      Instructions Start Date   budesonide-formoterol 160-4.5 MCG/ACT inhaler  Commonly known as:  SYMBICORT   2 puffs, Inhalation, 2 Times Daily - RT      Calcium 600-200 MG-UNIT per tablet   2 tablets,  Oral, Daily      cetirizine 5 MG tablet  Commonly known as:  zyrTEC   5 mg, Oral, Daily      clopidogrel 75 MG tablet  Commonly known as:  PLAVIX   75 mg, Oral, Daily      docusate calcium 240 MG capsule  Commonly known as:  SURFAK   240 mg, Oral, 2 Times Daily      FLUoxetine 20 MG capsule  Commonly known as:  PROzac   20 mg, Oral, Daily      montelukast 10 MG tablet  Commonly known as:  SINGULAIR   10 mg, Oral, Nightly      MULTIVITAMIN ADULT PO   1 tablet, Oral      OLANZapine 5 MG tablet  Commonly known as:  zyPREXA   5 mg, Oral, Nightly      ondansetron 4 MG tablet  Commonly known as:  ZOFRAN   4 mg, Oral, Every 8 Hours PRN      tiotropium 18 MCG per inhalation capsule  Commonly known as:  SPIRIVA   1 capsule, Inhalation, Daily - RT         Stop These Medications    aspirin 325 MG tablet  Replaced by:  aspirin 81 MG EC tablet     famotidine 20 MG tablet  Commonly known as:  PEPCID            Discharge Diet: Cardiac    Activity at Discharge: As tolerated    Follow-up Appointments  Future Appointments   Date Time Provider Department Center   7/21/2020 11:45 AM Angela Buckley MD [facility] None     Additional Instructions for the Follow-ups that You Need to Schedule     Discharge Follow-up with Specialty: [facility]; 6 Weeks   As directed      Specialty:  [facility]    Follow Up:  6 Weeks    Follow Up Details:  Hospital FU for cp             Patient should follow-up with PCP within 1 to 2 weeks.    Test Results Pending at Discharge       Azeem Rivera MD  05/22/20  11:22    Time: Discharge 32 min

## 2020-05-22 NOTE — PROGRESS NOTES
Discharge Planning Assessment  [facility]     Patient Name: Genoveva Velázquez  MRN: [id number]  Today's Date: 5/22/2020    Admit Date: 5/21/2020    Discharge Needs Assessment     Row Name 05/22/20 1039       Living Environment    Lives With  child(lori), adult    Current Living Arrangements  home/apartment/condo    Living Arrangement Comments  Her daughter lives with her. No steps at home. Daughter assists as needed.       Discharge Needs Assessment    Equipment Currently Used at Home  walker, rolling;bath bench    Equipment Needed After Discharge  none    Discharge Coordination/Progress  Has had [facility] in the past. Has a [facility] alert system in place.        Discharge Plan     Row Name 05/22/20 1041       Plan    Plan  Home at DC    Patient/Family in Agreement with Plan  yes    Plan Comments  I spoke with the pt. She has no DC needs at this time.    Row Name 05/22/20 0838       Plan    Final Discharge Disposition Code  01 - home or self-care        Destination      Coordination has not been started for this encounter.      Durable Medical Equipment      Coordination has not been started for this encounter.      Dialysis/Infusion      Coordination has not been started for this encounter.      Home Medical Care      Coordination has not been started for this encounter.      Therapy      Coordination has not been started for this encounter.      Community Resources      Coordination has not been started for this encounter.        Expected Discharge Date and Time     Expected Discharge Date Expected Discharge Time    May 23, 2020         Demographic Summary    No documentation.       Functional Status     Row Name 05/22/20 1039       Functional Status    Usual Activity Tolerance  good       Functional Status, IADL    Medications  independent    Meal Preparation  assistive person    Housekeeping  assistive person    Laundry  assistive person    Shopping  assistive person        Psychosocial    No documentation.        Abuse/Neglect    No documentation.       Legal    No documentation.       Substance Abuse    No documentation.       Patient Forms    No documentation.           Danni Herrera RN

## 2020-05-22 NOTE — PLAN OF CARE
Problem: Patient Care Overview  Goal: Plan of Care Review  Outcome: Ongoing (interventions implemented as appropriate)  Flowsheets  Taken 5/22/2020 0419  Progress: improving  Outcome Summary: VSS on RA.  No acute issues noted overnight.  Denies chest pain.  NS infusing at 100.  NPO since 0000.  Will continue to monitor.  Taken 5/21/2020 2000  Plan of Care Reviewed With: patient

## 2020-05-26 RX ORDER — LISINOPRIL 5 MG/1
5 TABLET ORAL DAILY
Qty: 30 TABLET | Refills: 11 | Status: SHIPPED | OUTPATIENT
Start: 2020-05-26 | End: 2020-07-21 | Stop reason: SINTOL

## 2020-05-26 RX ORDER — NITROGLYCERIN 0.4 MG/1
0.4 TABLET SUBLINGUAL
Qty: 25 TABLET | Refills: 12 | Status: SHIPPED | OUTPATIENT
Start: 2020-05-26

## 2020-05-26 RX ORDER — ATORVASTATIN CALCIUM 40 MG/1
40 TABLET, FILM COATED ORAL NIGHTLY
Qty: 30 TABLET | Refills: 11 | Status: SHIPPED | OUTPATIENT
Start: 2020-05-26 | End: 2020-07-21

## 2020-05-26 RX ORDER — PANTOPRAZOLE SODIUM 40 MG/1
40 TABLET, DELAYED RELEASE ORAL
Qty: 60 TABLET | Refills: 3 | Status: SHIPPED | OUTPATIENT
Start: 2020-05-26

## 2020-05-26 RX ORDER — ASPIRIN 81 MG/1
81 TABLET ORAL DAILY
Qty: 90 TABLET | Refills: 3 | Status: SHIPPED | OUTPATIENT
Start: 2020-05-26 | End: 2020-07-21

## 2020-05-26 RX ORDER — ISOSORBIDE MONONITRATE 30 MG/1
30 TABLET, EXTENDED RELEASE ORAL
Qty: 30 TABLET | Refills: 11 | Status: SHIPPED | OUTPATIENT
Start: 2020-05-26 | End: 2020-07-21

## 2020-07-21 ENCOUNTER — OFFICE VISIT (OUTPATIENT)
Dept: CARDIOLOGY | Facility: CLINIC | Age: 80
End: 2020-07-21

## 2020-07-21 VITALS
BODY MASS INDEX: 28.68 KG/M2 | WEIGHT: 168 LBS | HEIGHT: 64 IN | DIASTOLIC BLOOD PRESSURE: 60 MMHG | SYSTOLIC BLOOD PRESSURE: 80 MMHG | HEART RATE: 60 BPM

## 2020-07-21 DIAGNOSIS — I25.10 CORONARY ARTERY DISEASE INVOLVING NATIVE CORONARY ARTERY OF NATIVE HEART WITHOUT ANGINA PECTORIS: ICD-10-CM

## 2020-07-21 DIAGNOSIS — I95.2 HYPOTENSION DUE TO DRUGS: Primary | ICD-10-CM

## 2020-07-21 DIAGNOSIS — I95.1 ORTHOSTASIS: ICD-10-CM

## 2020-07-21 DIAGNOSIS — E78.2 MIXED HYPERLIPIDEMIA: ICD-10-CM

## 2020-07-21 PROCEDURE — 99214 OFFICE O/P EST MOD 30 MIN: CPT | Performed by: INTERNAL MEDICINE

## 2020-07-21 RX ORDER — LABETALOL 100 MG/1
100 TABLET, FILM COATED ORAL 2 TIMES DAILY
COMMUNITY
End: 2022-04-19

## 2020-07-21 RX ORDER — ROSUVASTATIN CALCIUM 20 MG/1
20 TABLET, COATED ORAL DAILY
COMMUNITY
End: 2022-04-19

## 2020-07-21 RX ORDER — LEVALBUTEROL INHALATION SOLUTION 0.63 MG/3ML
1 SOLUTION RESPIRATORY (INHALATION) EVERY 4 HOURS PRN
COMMUNITY
End: 2022-04-19

## 2020-07-21 RX ORDER — CHLORTHALIDONE 25 MG/1
12.5 TABLET ORAL DAILY
COMMUNITY
End: 2020-07-21 | Stop reason: SINTOL

## 2020-07-21 NOTE — PROGRESS NOTES
[facility] Cardiology    Encounter Date: 2020    Patient ID: Genoveva Velázquez is a 79 y.o. female.  : 1940     PCP: Honey Newsome MD       Chief Complaint: Coronary Artery Disease and Hypotension      PROBLEM LIST:  1. Coronary artery disease:  a. History of stents.  b. Echo, 2020: EF > 60%. Normal valves.  c. MPS, 2020: Small-to-medium sized, mild-to-moderately severe anterior wall defect. This possibly represents chronic, low-risk ischemia in the LAD. No new ischemia. EF 79%. No significant change or new abnormality compared with 3 previous nuclear stress test.   2. Hypertension  3. Hyperlipidemia  4. Orthostasis:  a. [facility] ED presentation with falls, 2020. Found to be orthostatic. Home diuretics discontinued. Transfer to [facility] for possible LHC, deferred.   5. Tobacco abuse  6. H/o CVA  7. GERD  8. CKD  9. Dementia     History of Present Illness  Patient presents today for a hospital follow-up with a history of coronary artery disease, orthostasis, and cardiac risk factors. She was initially hospitalized at [facility] for falls and was found to be orthostatic. She was subsequently transferred to [facility] for a possible LHC due to abnormal nuclear stress test, but this was deferred. Since discharge, she has not had any recurrent falls, but she does report some persistent weakness and fatigue. Patient denies chest pain, palpitations, edema, and syncope.      Allergies   Allergen Reactions   • Sulfa Antibiotics Unknown - High Severity     Per patient, mother told her not to take         Current Outpatient Medications:   •  budesonide-formoterol (SYMBICORT) 160-4.5 MCG/ACT inhaler, Inhale 2 puffs 2 (Two) Times a Day., Disp: , Rfl:   •  Calcium 600-200 MG-UNIT per tablet, Take 2 tablets by mouth Daily., Disp: , Rfl:   •  cetirizine (zyrTEC) 5 MG tablet, Take 5 mg by mouth Daily., Disp: , Rfl:   •  chlorthalidone (HYGROTON) 25 mg tablet, Take 12.5 mg by mouth  Daily  •  clopidogrel (PLAVIX) 75 MG tablet, Take 75 mg by mouth Daily., Disp: , Rfl:   •  docusate calcium (SURFAK) 240 MG capsule, Take 240 mg by mouth 2 (Two) Times a Day., Disp: , Rfl:   •  FLUoxetine (PROzac) 20 MG capsule, Take 20 mg by mouth Daily., Disp: , Rfl:   •  labetalol (NORMODYNE) 100 MG tablet, Take 100 mg by mouth 2 (Two) Times a Day., Disp: , Rfl:   •  lisinopril (PRINIVIL,ZESTRIL) 5 MG tablet, Take 10 mg by mouth Daily.  •  levalbuterol (XOPENEX) 0.63 MG/3ML nebulizer solution, Take 1 ampule by nebulization Every 4 (Four) Hours As Needed for Wheezing., Disp: , Rfl:   •  montelukast (SINGULAIR) 10 MG tablet, Take 10 mg by mouth Every Night., Disp: , Rfl:   •  Multiple Vitamins-Minerals (MULTIVITAMIN ADULT PO), Take 1 tablet by mouth., Disp: , Rfl:   •  nitroglycerin (NITROSTAT) 0.4 MG SL tablet, Place 1 tablet under the tongue Every 5 (Five) Minutes As Needed for Chest Pain. Take no more than 3 doses in 15 minutes., Disp: 25 tablet, Rfl: 12  •  OLANZapine (zyPREXA) 5 MG tablet, Take 10 mg by mouth Every Night., Disp: , Rfl:   •  ondansetron (ZOFRAN) 4 MG tablet, Take 4 mg by mouth Every 8 (Eight) Hours As Needed for Nausea or Vomiting., Disp: , Rfl:   •  pantoprazole (PROTONIX) 40 MG EC tablet, Take 1 tablet by mouth 2 (Two) Times a Day Before Meals., Disp: 60 tablet, Rfl: 3  •  rosuvastatin (CRESTOR) 20 MG tablet, Take 20 mg by mouth Daily., Disp: , Rfl:   •  tiotropium (SPIRIVA) 18 MCG per inhalation capsule, Place 1 capsule into inhaler and inhale Daily., Disp: , Rfl:     The following portions of the patient's history were reviewed and updated as appropriate: allergies, current medications, past family history, past medical history, past social history, past surgical history and problem list.    ROS  Review of Systems   Constitution: Negative for chills, fever. Positive for fatigue, generalized weakness.   Cardiovascular: Negative for chest pain, dyspnea on exertion, leg swelling, palpitations,  "orthopnea, and syncope.   Respiratory: Negative for cough, shortness of breath, and wheezing.  HENT: Negative for ear pain, nosebleeds, and tinnitus.  Gastrointestinal: Negative for abdominal pain, constipation, diarrhea, nausea and vomiting.   Genitourinary: No urinary symptoms.  Musculoskeletal: Negative for muscle cramps.  Neurological: Negative for dizziness, headaches, loss of balance, numbness, and symptoms of stroke.  Psychiatric: Normal mental status.     All other systems reviewed and are negative.        Objective:     BP (!) 80/60 (BP Location: Right arm, Patient Position: Sitting)   Pulse 60   Ht 162.6 cm (64\")   Wt 76.2 kg (168 lb)   BMI 28.84 kg/m²    Repeat BP measurement by Dr. Buckley: 80/60.    Physical Exam  Constitutional: Patient appears well-developed and well-nourished.   HENT: HEENT exam unremarkable.   Neck: Neck supple. No JVD present. No carotid bruits.   Cardiovascular: Normal rate, regular rhythm and normal heart sounds. No murmur heard.   2+ symmetric pulses.   Pulmonary/Chest: Breath sounds normal. Does not exhibit tenderness.   Abdominal: Abdomen benign.   Musculoskeletal: Does not exhibit edema.   Neurological: Neurological exam unremarkable.   Vitals reviewed.    Data Review:   Lab Results   Component Value Date    GLUCOSE 91 05/22/2020    BUN 27 (H) 05/22/2020    CREATININE 1.45 (H) 05/22/2020    EGFRIFNONA 35 (L) 05/22/2020    BCR 18.6 05/22/2020    K 3.9 05/22/2020    CO2 21.0 (L) 05/22/2020    CALCIUM 8.0 (L) 05/22/2020    ALBUMIN 3.90 05/21/2020    AST 12 05/21/2020    ALT 8 05/21/2020     Lab Results   Component Value Date    CHOL 141 05/22/2020    TRIG 100 05/22/2020    HDL 56 05/22/2020    LDL 65 05/22/2020      Lab Results   Component Value Date    WBC 8.19 05/21/2020    RBC 4.05 05/21/2020    HGB 11.7 (L) 05/21/2020    HCT 36.8 05/21/2020    MCV 90.9 05/21/2020     05/21/2020     Lab Results   Component Value Date    HGBA1C 5.70 (H) 05/22/2020        Procedures    "    Assessment:      Diagnosis Plan   1. Hypotension due to drugs causing fatigue and weakness. Discontinue chlorthalidone and lisinopril due to hypotension (80/60) and weakness.   2. Coronary artery disease involving native coronary artery of native heart without angina pectoris  Stable and asymptomatic, continue Plavix for antiplatelet therapy. NTG as needed for CP.   3. Mixed hyperlipidemia  Excellent control, continue rosuvastatin 20 mg.   4. Orthostasis  Recent hospitalization for fall due to orthostasis. Home diuretics were discontinued at that time. BP still low with associated weakness/fatigue. DC chlorthalidone and lisinopril.     Plan:   Discontinue chlorthalidone and lisinopril due to hypotension (80/60) and weakness. Monitor BP at home.  Continue all other current medications.   FU in 1 MO, sooner as needed.  Thank you for allowing us to participate in the care of your patient.     Scribed for Angela Buckley MD by Monica Madsen. 7/21/2020  12:44     I, Angela Buckley MD, personally performed the services described in this documentation as scribed by the above named individual in my presence, and it is both accurate and complete.  7/21/2020  12:49        Please note that portions of this note may have been completed with a voice recognition program. Efforts were made to edit the dictations, but occasionally words are mistranscribed.

## 2021-09-17 ENCOUNTER — OUTSIDE FACILITY SERVICE (OUTPATIENT)
Dept: CARDIOLOGY | Facility: CLINIC | Age: 81
End: 2021-09-17

## 2021-09-17 PROCEDURE — 93306 TTE W/DOPPLER COMPLETE: CPT | Performed by: INTERNAL MEDICINE

## 2021-09-20 NOTE — PROGRESS NOTES
[facility] Cardiology    Encounter Date: 2021    Patient ID: Genoveva Velázquze is a 81 y.o. female.  : 1940     PCP: Honey Newsome MD       Chief Complaint: Hypotension due to drugs      PROBLEM LIST:  1. Coronary artery disease:  a. History of stents.  b. Echo, 2020: EF > 60%. Normal valves.  c. MPS, 2020: Small-to-medium sized, mild-to-moderately severe anterior wall defect. This possibly represents chronic, low-risk ischemia in the LAD. No new ischemia. EF 79%. No significant change or new abnormality compared with 3 previous nuclear stress test.   d. MPS, 2021: EF >75%. Normal.  2. Chest pain  a. XR Chest, 2021: Tortuous aorta. Large hiatal hernia. Pulmonary hyperinflation.   3. Abdominal aortic aneurysm  a. CTA abdomen, 2021: AAA measures 3.6 cm increased from 3.2 cm in 2016. Large hiatal hernia.   b. US aorta, 2021: AAA measures 3.6 cm   4. Hypertension  5. Hyperlipidemia  6. Orthostasis:  a. [facility] ED presentation with falls, 2020. Found to be orthostatic. Home diuretics discontinued. Transfer to [facility] for possible C, deferred.   7. Tobacco abuse  8. H/o CVA  9. GERD  10. CKD  10. Dementia    History of Present Illness  Patient presents today for a follow-up with a history of coronary artery disease, orthostasis, and cardiac risk factors. Since last visit, she reports ongoing  lower extremity edema and random exertional or nonexertional chest fullness and associated shortness of breath.  She denies being very physically active. Her daughter notes she does not eat as frequently as she should but when she does this leads to fullness and shortness of breath.  CTA of abdomen noted large hiatal hernia on  and she was noted to have very stable small abdominal aortic aneurysm on aortic US .  Conservative management has been recommended for both conditions.  Patient denies chest pain, palpitations, dizziness, and syncope.      Allergies   Allergen Reactions   • Sulfa Antibiotics Unknown - High Severity     Per patient, mother told her not to take         Current Outpatient Medications:   •  atorvastatin (LIPITOR) 40 MG tablet, Take 40 mg by mouth Daily., Disp: , Rfl:   •  budesonide-formoterol (SYMBICORT) 160-4.5 MCG/ACT inhaler, Inhale 2 puffs 2 (Two) Times a Day., Disp: , Rfl:   •  Calcium 600-200 MG-UNIT per tablet, Take 2 tablets by mouth Daily., Disp: , Rfl:   •  cetirizine (zyrTEC) 5 MG tablet, Take 5 mg by mouth Daily., Disp: , Rfl:   •  clonazePAM (KlonoPIN) 0.5 MG tablet, Take 0.5 mg by mouth 2 (Two) Times a Day As Needed., Disp: , Rfl:   •  clopidogrel (PLAVIX) 75 MG tablet, Take 75 mg by mouth Daily., Disp: , Rfl:   •  docusate calcium (SURFAK) 240 MG capsule, Take 240 mg by mouth 2 (Two) Times a Day., Disp: , Rfl:   •  famotidine (PEPCID) 10 MG tablet, Take 10 mg by mouth 2 (Two) Times a Day., Disp: , Rfl:   •  Fe Bisgly-Succ-C-Thre-B12-FA (IRON-150 PO), Take  by mouth., Disp: , Rfl:   •  FLUoxetine (PROzac) 60 MG tablet, Take 60 mg by mouth Daily., Disp: , Rfl:   •  furosemide (LASIX) 20 MG tablet, Take 20 mg by mouth 2 (Two) Times a Day., Disp: , Rfl:   •  GoodSense Aspirin Low Dose 81 MG EC tablet, TAKE 1 TABLET BY MOUTH DAILY. , Disp: 90 tablet, Rfl: 3  •  HYDROcodone-acetaminophen (NORCO) 5-325 MG per tablet, Take 1 tablet by mouth Every 6 (Six) Hours As Needed., Disp: , Rfl:   •  isosorbide mononitrate (IMDUR) 60 MG 24 hr tablet, Take 60 mg by mouth Daily., Disp: , Rfl:   •  levalbuterol (XOPENEX) 0.63 MG/3ML nebulizer solution, Take 1 ampule by nebulization Every 4 (Four) Hours As Needed for Wheezing., Disp: , Rfl:   •  lisinopril (PRINIVIL,ZESTRIL) 5 MG tablet, Take 5 mg by mouth Daily., Disp: , Rfl:   •  montelukast (SINGULAIR) 10 MG tablet, Take 10 mg by mouth Every Night., Disp: , Rfl:   •  Multiple Vitamins-Minerals (MULTIVITAMIN ADULT PO), Take 1 tablet by mouth., Disp: , Rfl:   •  nitroglycerin (NITROSTAT)  "0.4 MG SL tablet, Place 1 tablet under the tongue Every 5 (Five) Minutes As Needed for Chest Pain. Take no more than 3 doses in 15 minutes., Disp: 25 tablet, Rfl: 12  •  ondansetron (ZOFRAN) 4 MG tablet, Take 4 mg by mouth Every 8 (Eight) Hours As Needed for Nausea or Vomiting., Disp: , Rfl:   •  pantoprazole (PROTONIX) 40 MG EC tablet, Take 1 tablet by mouth 2 (Two) Times a Day Before Meals., Disp: 60 tablet, Rfl: 3  •  tiotropium (SPIRIVA) 18 MCG per inhalation capsule, Place 1 capsule into inhaler and inhale Daily., Disp: , Rfl:   •  labetalol (NORMODYNE) 100 MG tablet, Take 100 mg by mouth 2 (Two) Times a Day., Disp: , Rfl:   •  OLANZapine (zyPREXA) 5 MG tablet, Take 10 mg by mouth Every Night., Disp: , Rfl:   •  rosuvastatin (CRESTOR) 20 MG tablet, Take 20 mg by mouth Daily., Disp: , Rfl:     The following portions of the patient's history were reviewed and updated as appropriate: allergies, current medications, past family history, past medical history, past social history, past surgical history and problem list.    ROS  Review of Systems   Constitution: Negative for chills, fever, fatigue, generalized weakness.   Cardiovascular: Negative for chest pain, dyspnea on exertion, palpitations, orthopnea, and syncope. Positive for leg swelling  Respiratory: Negative for cough, and wheezing. Positive for shortness of breath  HENT: Negative for ear pain, nosebleeds, and tinnitus.  Gastrointestinal: Negative for abdominal pain, constipation, diarrhea, nausea and vomiting.   Genitourinary: No urinary symptoms.  Musculoskeletal: Negative for muscle cramps.  Neurological: Negative for dizziness, headaches, loss of balance, numbness, and symptoms of stroke.  Psychiatric: Normal mental status.     All other systems reviewed and are negative.        Objective:     BP 98/54 (BP Location: Left arm, Patient Position: Sitting)   Pulse 66   Ht 160 cm (63\")   Wt 74.4 kg (164 lb)   BMI 29.05 kg/m²      Physical " Exam  Constitutional: Patient appears well-developed and well-nourished.   HENT: HEENT exam unremarkable.   Neck: Neck supple. No JVD present. No carotid bruits.   Cardiovascular: Normal rate, regular rhythm and normal heart sounds. No murmur heard.   2+ symmetric pulses.   Pulmonary/Chest: Breath sounds normal. Does not exhibit tenderness.   Abdominal: Abdomen benign.   Musculoskeletal: Does not exhibit edema.   Neurological: Neurological exam unremarkable.   Vitals reviewed.    Data Review:     Lab date: 7/26/2021  • CMP: Glu 217, BUN 24, Creat 1.52, eGFR 33, Na 128, K 3.7, Cl 93, CO2 25, Ca 9.0, Alk Phos 56, AST 21, ALT 18  • CBC: WBC 6.6, RBC 3.84, HGB 11.5, HCT 34, MCV 88.5, MCH 30.0,   • D-dimer: 360  • BNP: 109    Lab Results   Component Value Date    CHOL 141 05/22/2020    TRIG 100 05/22/2020    HDL 56 05/22/2020    LDL 65 05/22/2020      Lab Results   Component Value Date    HGBA1C 5.70 (H) 05/22/2020        Procedures       Assessment:      Diagnosis Plan   1. Coronary artery disease involving native coronary artery of native heart without typical angina pectoris  Stable without significant or typical angina; continue current medications   2. Mixed hyperlipidemia  Well controlled; continue atorvastatin 40 mg    3. Hypotension due to drugs  Stable; continue current medications     Plan:   Stable cardiac status.  Discussed that her symptoms are likely related to hiatal hernia, recommend eating smaller portions continuing her Protonix and follow-up with PCP for further recommendations.  Regarding exertional shortness of breath it may also be related to deconditioning and I have recommended regular aerobic activities to improve endurance.  Continue current medications.   FU in 6 MO, sooner as needed.  Thank you for allowing us to participate in the care of your patient.     Scribed for Angela Buckley MD by Julianna De Los Santos. 9/21/2021 14:27 EDT    I, Angela Buckley MD, personally performed the services  described in this documentation as scribed by the above named individual in my presence, and it is both accurate and complete.  9/22/2021  07:03 EDT        Please note that portions of this note may have been completed with a voice recognition program. Efforts were made to edit the dictations, but occasionally words are mistranscribed.

## 2021-09-21 ENCOUNTER — OFFICE VISIT (OUTPATIENT)
Dept: CARDIOLOGY | Facility: CLINIC | Age: 81
End: 2021-09-21

## 2021-09-21 VITALS
DIASTOLIC BLOOD PRESSURE: 54 MMHG | BODY MASS INDEX: 29.06 KG/M2 | SYSTOLIC BLOOD PRESSURE: 98 MMHG | WEIGHT: 164 LBS | HEART RATE: 66 BPM | HEIGHT: 63 IN

## 2021-09-21 DIAGNOSIS — I95.2 HYPOTENSION DUE TO DRUGS: ICD-10-CM

## 2021-09-21 DIAGNOSIS — I25.10 CORONARY ARTERY DISEASE INVOLVING NATIVE CORONARY ARTERY OF NATIVE HEART WITHOUT ANGINA PECTORIS: Primary | ICD-10-CM

## 2021-09-21 DIAGNOSIS — E78.2 MIXED HYPERLIPIDEMIA: ICD-10-CM

## 2021-09-21 PROCEDURE — 99214 OFFICE O/P EST MOD 30 MIN: CPT | Performed by: INTERNAL MEDICINE

## 2021-09-21 RX ORDER — CLONAZEPAM 0.5 MG/1
0.5 TABLET ORAL 2 TIMES DAILY PRN
COMMUNITY
End: 2022-04-19

## 2021-09-21 RX ORDER — FUROSEMIDE 20 MG/1
20 TABLET ORAL DAILY
COMMUNITY

## 2021-09-21 RX ORDER — FAMOTIDINE 10 MG
20 TABLET ORAL AS NEEDED
COMMUNITY

## 2021-09-21 RX ORDER — ATORVASTATIN CALCIUM 40 MG/1
40 TABLET, FILM COATED ORAL DAILY
COMMUNITY

## 2021-09-21 RX ORDER — LISINOPRIL 5 MG/1
2.5 TABLET ORAL DAILY
COMMUNITY

## 2021-09-21 RX ORDER — HYDROCODONE BITARTRATE AND ACETAMINOPHEN 5; 325 MG/1; MG/1
1 TABLET ORAL EVERY 6 HOURS PRN
COMMUNITY
End: 2022-04-19 | Stop reason: DRUGHIGH

## 2021-09-21 RX ORDER — ISOSORBIDE MONONITRATE 60 MG/1
60 TABLET, EXTENDED RELEASE ORAL DAILY
COMMUNITY

## 2022-04-19 ENCOUNTER — OFFICE VISIT (OUTPATIENT)
Dept: CARDIOLOGY | Facility: CLINIC | Age: 82
End: 2022-04-19

## 2022-04-19 VITALS
BODY MASS INDEX: 28.51 KG/M2 | HEIGHT: 64 IN | DIASTOLIC BLOOD PRESSURE: 62 MMHG | OXYGEN SATURATION: 96 % | SYSTOLIC BLOOD PRESSURE: 116 MMHG | HEART RATE: 67 BPM | WEIGHT: 167 LBS

## 2022-04-19 DIAGNOSIS — E78.2 MIXED HYPERLIPIDEMIA: ICD-10-CM

## 2022-04-19 DIAGNOSIS — I25.10 CORONARY ARTERY DISEASE INVOLVING NATIVE CORONARY ARTERY OF NATIVE HEART WITHOUT ANGINA PECTORIS: Primary | ICD-10-CM

## 2022-04-19 DIAGNOSIS — I95.2 HYPOTENSION DUE TO DRUGS: ICD-10-CM

## 2022-04-19 PROCEDURE — 99214 OFFICE O/P EST MOD 30 MIN: CPT | Performed by: INTERNAL MEDICINE

## 2022-04-19 RX ORDER — ALBUTEROL SULFATE 90 UG/1
AEROSOL, METERED RESPIRATORY (INHALATION) AS NEEDED
COMMUNITY
Start: 2022-04-01

## 2022-04-19 RX ORDER — HYDROCODONE BITARTRATE AND ACETAMINOPHEN 7.5; 325 MG/1; MG/1
1 TABLET ORAL EVERY 8 HOURS PRN
COMMUNITY

## 2022-04-19 RX ORDER — CHLORTHALIDONE 25 MG/1
12.54 TABLET ORAL DAILY
COMMUNITY
Start: 2022-04-18 | End: 2022-11-15 | Stop reason: SDUPTHER

## 2022-04-19 RX ORDER — DIAZEPAM 5 MG/1
5 TABLET ORAL DAILY
COMMUNITY
End: 2022-11-15

## 2022-04-19 NOTE — PROGRESS NOTES
[facility] Cardiology    Encounter Date: 2022    Patient ID: Genoveva Velázquez is a 81 y.o. female.  : 1940     PCP: Willie Sidhu MD       Chief Complaint: Coronary artery disease involving native coronary artery of and Hypotension due to drugs      PROBLEM LIST:  1. Coronary artery disease:  a. History of stents.  b. Echo, 2020: EF > 60%. Normal valves.  c. MPS, 2020: Small-to-medium sized, mild-to-moderately severe anterior wall defect. This possibly represents chronic, low-risk ischemia in the LAD. No new ischemia. EF 79%. No significant change or new abnormality compared with 3 previous nuclear stress test.   d. MPS, 2021: EF >75%. Normal.  2. Chest pain  a. XR Chest, 2021: Tortuous aorta. Large hiatal hernia. Pulmonary hyperinflation.   3. Abdominal aortic aneurysm  a. CTA abdomen, 2021: AAA measures 3.6 cm increased from 3.2 cm in 2016. Large hiatal hernia.   b. US aorta, 2021: AAA measures 3.6 cm   4. Hypertension  5. Hyperlipidemia  6. Orthostasis:  a. [facility] ED presentation with falls, 2020. Found to be orthostatic. Home diuretics discontinued[facility]   7. Tobacco abuse  8. H/o CVA  9. GERD  10. CKD  11. Dementia    History of Present Illness  Patient presents today for a 6 month follow-up with a history of coronary artery disease and cardiac risk factors. Since last visit, she complains of occasional headaches but feels that this could possibly be coming from her starting to cut back on her smoking and decreased caffeine intake. She has went down to smoking a carton and a half a week and has switched to decaf coffee. Otherwise, patient is doing well from a cardiovascular standpoint. She is getting some exercise and staying busy. Patient denies chest pain, shortness of breath, orthopnea, palpitations, edema, dizziness, and syncope.         Allergies   Allergen Reactions   • Sulfa  Antibiotics Unknown - High Severity     Per patient, mother told her not to take         Current Outpatient Medications:   •  albuterol sulfate  (90 Base) MCG/ACT inhaler, As Needed., Disp: , Rfl:   •  atorvastatin (LIPITOR) 40 MG tablet, Take 40 mg by mouth Daily., Disp: , Rfl:   •  budesonide-formoterol (SYMBICORT) 160-4.5 MCG/ACT inhaler, Inhale 2 puffs 2 (Two) Times a Day., Disp: , Rfl:   •  Calcium 600-200 MG-UNIT per tablet, Take 2 tablets by mouth Daily., Disp: , Rfl:   •  cetirizine (zyrTEC) 5 MG tablet, Take 5 mg by mouth Daily., Disp: , Rfl:   •  chlorthalidone (HYGROTON) 25 MG tablet, 12.54 mg Daily., Disp: , Rfl:   •  clopidogrel (PLAVIX) 75 MG tablet, Take 75 mg by mouth Daily., Disp: , Rfl:   •  diazePAM (VALIUM) 5 MG tablet, Take 5 mg by mouth Daily., Disp: , Rfl:   •  docusate calcium (SURFAK) 240 MG capsule, Take 240 mg by mouth 2 (Two) Times a Day., Disp: , Rfl:   •  famotidine (PEPCID) 10 MG tablet, Take 20 mg by mouth Daily., Disp: , Rfl:   •  Fe Bisgly-Succ-C-Thre-B12-FA (IRON-150 PO), Take  by mouth., Disp: , Rfl:   •  FLUoxetine (PROzac) 60 MG tablet, Take 60 mg by mouth Daily., Disp: , Rfl:   •  furosemide (LASIX) 20 MG tablet, Take 20 mg by mouth Daily., Disp: , Rfl:   •  GoodSense Aspirin Low Dose 81 MG EC tablet, TAKE 1 TABLET BY MOUTH DAILY. , Disp: 90 tablet, Rfl: 3  •  HYDROcodone-acetaminophen (NORCO) 7.5-325 MG per tablet, Take 1 tablet by mouth Every 8 (Eight) Hours As Needed for Moderate Pain ., Disp: , Rfl:   •  isosorbide mononitrate (IMDUR) 60 MG 24 hr tablet, Take 60 mg by mouth Daily., Disp: , Rfl:   •  lisinopril (PRINIVIL,ZESTRIL) 5 MG tablet, Take 5 mg by mouth Daily., Disp: , Rfl:   •  montelukast (SINGULAIR) 10 MG tablet, Take 10 mg by mouth Every Night., Disp: , Rfl:   •  nitroglycerin (NITROSTAT) 0.4 MG SL tablet, Place 1 tablet under the tongue Every 5 (Five) Minutes As Needed for Chest Pain. Take no more than 3 doses in 15 minutes., Disp: 25 tablet, Rfl: 12  •   "ondansetron (ZOFRAN) 4 MG tablet, Take 4 mg by mouth Every 8 (Eight) Hours As Needed for Nausea or Vomiting., Disp: , Rfl:   •  pantoprazole (PROTONIX) 40 MG EC tablet, Take 1 tablet by mouth 2 (Two) Times a Day Before Meals. (Patient taking differently: Take 40 mg by mouth Daily.), Disp: 60 tablet, Rfl: 3    The following portions of the patient's history were reviewed and updated as appropriate: allergies, current medications, past family history, past medical history, past social history, past surgical history and problem list.    ROS  Review of Systems   Constitution: Negative for chills, fever, fatigue, generalized weakness.   Cardiovascular: Negative for chest pain, dyspnea on exertion, leg swelling, palpitations, orthopnea, and syncope.   Respiratory: Negative for cough, shortness of breath, and wheezing.  HENT: Negative for ear pain, nosebleeds, and tinnitus.  Gastrointestinal: Negative for abdominal pain, constipation, diarrhea, nausea and vomiting.   Genitourinary: No urinary symptoms.  Musculoskeletal: Negative for muscle cramps.  Neurological: Negative for dizziness, headaches, loss of balance, numbness, and symptoms of stroke.  Psychiatric: Normal mental status.     All other systems reviewed and are negative.        Objective:     /62 (BP Location: Left arm, Patient Position: Sitting)   Pulse 67   Ht 162.6 cm (64\")   Wt 75.8 kg (167 lb)   SpO2 96%   BMI 28.67 kg/m²      Physical Exam  Constitutional: Patient appears well-developed and well-nourished.   HENT: HEENT exam unremarkable.   Neck: Neck supple. No JVD present. No carotid bruits.   Cardiovascular: Normal rate, regular rhythm and normal heart sounds. No murmur heard.   2+ symmetric pulses.   Pulmonary/Chest: Breath sounds normal. Does not exhibit tenderness.   Abdominal: Abdomen benign.   Musculoskeletal: Does not exhibit edema.   Neurological: Neurological exam unremarkable.   Vitals reviewed.    Data Review:     Lab Results "   Component Value Date    CHOL 141 05/22/2020    TRIG 100 05/22/2020    HDL 56 05/22/2020    LDL 65 05/22/2020           Procedures           Assessment:      Diagnosis Plan   1. Coronary artery disease involving native coronary artery of native heart without angina pectoris  Stable without angina. Continue on plavix 75 mg, lasix 20 mg, aspirin 81 mg, Imdur 60 mg, and nitroglycerin 0.4 mg.    2. Mixed hyperlipidemia  Well controlled. Continue on atorvastatin 40 mg daily.    3. Hypotension due to drugs  Well controlled. Continue on lisinopril 5 mg daily.      Plan:   Stable cardiac status.  No current angina or CHF symptoms.  Risk factors are well modified.  Encouraged to continue to work on smoking cessation, maintain good hydration and may take Tylenol as needed for headache.  If symptoms persist she should see PCP for further assessment.  Continue current medications.   FU in 6 MO, sooner as needed.  Thank you for allowing us to participate in the care of your patient.     Scribed for Angela Buckley MD by Ivelisse David. 4/19/2022 13:53 EDT    I, Angela Buckley MD, personally performed the services described in this documentation as scribed by the above named individual in my presence, and it is both accurate and complete.  4/20/2022  06:07 EDT      Please note that portions of this note may have been completed with a voice recognition program. Efforts were made to edit the dictations, but occasionally words are mistranscribed.

## 2022-11-15 ENCOUNTER — OFFICE VISIT (OUTPATIENT)
Dept: CARDIOLOGY | Facility: CLINIC | Age: 82
End: 2022-11-15

## 2022-11-15 VITALS
SYSTOLIC BLOOD PRESSURE: 110 MMHG | WEIGHT: 164 LBS | BODY MASS INDEX: 28 KG/M2 | OXYGEN SATURATION: 95 % | HEART RATE: 53 BPM | HEIGHT: 64 IN | DIASTOLIC BLOOD PRESSURE: 71 MMHG

## 2022-11-15 DIAGNOSIS — I25.10 CORONARY ARTERY DISEASE INVOLVING NATIVE CORONARY ARTERY OF NATIVE HEART WITHOUT ANGINA PECTORIS: Primary | ICD-10-CM

## 2022-11-15 DIAGNOSIS — I95.1 ORTHOSTASIS: ICD-10-CM

## 2022-11-15 DIAGNOSIS — I10 ESSENTIAL HYPERTENSION: ICD-10-CM

## 2022-11-15 DIAGNOSIS — E78.2 MIXED HYPERLIPIDEMIA: ICD-10-CM

## 2022-11-15 PROCEDURE — 99214 OFFICE O/P EST MOD 30 MIN: CPT

## 2022-11-15 RX ORDER — APIXABAN 5 MG/1
TABLET, FILM COATED ORAL 2 TIMES DAILY
COMMUNITY
Start: 2022-11-08

## 2022-11-15 RX ORDER — LINACLOTIDE 145 UG/1
CAPSULE, GELATIN COATED ORAL
COMMUNITY
Start: 2022-09-21

## 2022-11-15 RX ORDER — CHLORTHALIDONE 25 MG/1
12.5 TABLET ORAL DAILY
Qty: 45 TABLET | Refills: 1 | Status: SHIPPED | OUTPATIENT
Start: 2022-11-15

## 2022-11-15 RX ORDER — METOPROLOL TARTRATE 50 MG/1
TABLET, FILM COATED ORAL 2 TIMES DAILY
COMMUNITY
Start: 2022-10-11 | End: 2022-11-15 | Stop reason: SDUPTHER

## 2022-11-15 NOTE — PROGRESS NOTES
[facility] Cardiology    Encounter Date: 11/15/2022    Patient ID: Genoveva Velázquez is a 82 y.o. female.  : 1940     PCP: Willie Sidhu MD       Chief Complaint: Coronary artery disease involving native coronary artery of      PROBLEM LIST:  1. Coronary artery disease:  a. History of stents.  b. Echo, 2020: EF > 60%. Normal valves.  c. MPS, 2020: Small-to-medium sized, mild-to-moderately severe anterior wall defect. This possibly represents chronic, low-risk ischemia in the LAD. No new ischemia. EF 79%. No significant change or new abnormality compared with 3 previous nuclear stress test.   d. MPS, 2021: EF >75%. Normal.  2. Chest pain  a. XR Chest, 2021: Tortuous aorta. Large hiatal hernia. Pulmonary hyperinflation.   3. Abdominal aortic aneurysm  a. CTA abdomen, 2021: AAA measures 3.6 cm increased from 3.2 cm in 2016. Large hiatal hernia.   b. US aorta, 2021: AAA measures 3.6 cm   4. Hypertension  5. Hyperlipidemia  6. Orthostasis:  a. [facility] ED presentation with falls, 2020. Found to be orthostatic. Home diuretics discontinued. Transfer to [facility] for possible C, deferred.   7. Tobacco abuse  8. H/o CVA  9. GERD  10. CKD  11. Dementia    History of Present Illness  Patient presents today for 6 month follow-up with a history of coronary artery disease, AAA, and cardiac risk factors. Since last visit, patient did have an ER visit with complaints of pain in her back. She was evaluated and discharged home on the same day. She has been doing well from a cardiac standpoint. She reports that she has been having intermittent sharp chest pain worse after she eats. She has a known hiatal hernia. No anginal chest pain or shortness of breath. She denies palpitations, orthopnea, and edema.     Allergies   Allergen Reactions   • Sulfa Antibiotics Unknown - High Severity     Per patient, mother told her not to take         Current Outpatient  Medications:   •  albuterol sulfate  (90 Base) MCG/ACT inhaler, As Needed., Disp: , Rfl:   •  atorvastatin (LIPITOR) 40 MG tablet, Take 40 mg by mouth Daily., Disp: , Rfl:   •  budesonide-formoterol (SYMBICORT) 160-4.5 MCG/ACT inhaler, Inhale 2 puffs 2 (Two) Times a Day., Disp: , Rfl:   •  Calcium 600-200 MG-UNIT per tablet, Take 2 tablets by mouth Daily., Disp: , Rfl:   •  cetirizine (zyrTEC) 5 MG tablet, Take 5 mg by mouth Daily., Disp: , Rfl:   •  chlorthalidone (HYGROTON) 25 MG tablet, Take 0.5 tablets by mouth Daily., Disp: 45 tablet, Rfl: 1  •  docusate calcium (SURFAK) 240 MG capsule, Take 240 mg by mouth 2 (Two) Times a Day., Disp: , Rfl:   •  Eliquis 5 MG tablet tablet, 2 (Two) Times a Day., Disp: , Rfl:   •  famotidine (PEPCID) 10 MG tablet, Take 20 mg by mouth As Needed., Disp: , Rfl:   •  Fe Bisgly-Succ-C-Thre-B12-FA (IRON-150 PO), Take  by mouth., Disp: , Rfl:   •  FLUoxetine (PROzac) 60 MG tablet, Take 60 mg by mouth Daily., Disp: , Rfl:   •  furosemide (LASIX) 20 MG tablet, Take 20 mg by mouth Daily., Disp: , Rfl:   •  GoodSense Aspirin Low Dose 81 MG EC tablet, TAKE 1 TABLET BY MOUTH DAILY. , Disp: 90 tablet, Rfl: 3  •  HYDROcodone-acetaminophen (NORCO) 7.5-325 MG per tablet, Take 1 tablet by mouth Every 8 (Eight) Hours As Needed for Moderate Pain ., Disp: , Rfl:   •  isosorbide mononitrate (IMDUR) 60 MG 24 hr tablet, Take 60 mg by mouth Daily., Disp: , Rfl:   •  Linzess 145 MCG capsule capsule, 1 CAPSULE AT LEAST 30 MINUTES BEFORE THE FIRST MEAL OF THE DAY ON AN EMPTY STOMACH ORALLY ONCE A DAY, Disp: , Rfl:   •  lisinopril (PRINIVIL,ZESTRIL) 5 MG tablet, Take 2.5 mg by mouth Daily., Disp: , Rfl:   •  metoprolol tartrate (LOPRESSOR) 25 MG tablet, Take 1 tablet by mouth 2 (Two) Times a Day., Disp: 180 tablet, Rfl: 3  •  montelukast (SINGULAIR) 10 MG tablet, Take 10 mg by mouth Every Night., Disp: , Rfl:   •  nitroglycerin (NITROSTAT) 0.4 MG SL tablet, Place 1 tablet under the tongue Every 5  "(Five) Minutes As Needed for Chest Pain. Take no more than 3 doses in 15 minutes., Disp: 25 tablet, Rfl: 12  •  ondansetron (ZOFRAN) 4 MG tablet, Take 4 mg by mouth Every 8 (Eight) Hours As Needed for Nausea or Vomiting., Disp: , Rfl:   •  pantoprazole (PROTONIX) 40 MG EC tablet, Take 1 tablet by mouth 2 (Two) Times a Day Before Meals. (Patient taking differently: Take 40 mg by mouth Daily.), Disp: 60 tablet, Rfl: 3    The following portions of the patient's history were reviewed and updated as appropriate: allergies, current medications, past family history, past medical history, past social history, past surgical history and problem list.    ROS  14 point ROS negative except for that listed in the HPI        Objective:     /71 (BP Location: Left arm, Patient Position: Sitting)   Pulse 53   Ht 162.6 cm (64\")   Wt 74.4 kg (164 lb)   SpO2 95%   BMI 28.15 kg/m²      Physical Exam  Constitutional: Patient appears well-developed and well-nourished.   HENT: HEENT exam unremarkable.   Neck: Neck supple. No JVD present. No carotid bruits.   Cardiovascular: Normal rate, regular rhythm and normal heart sounds. No murmur heard.   2+ symmetric pulses.   Pulmonary/Chest: Breath sounds normal. Does not exhibit tenderness.   Abdominal: Abdomen benign.   Musculoskeletal: Does not exhibit edema.   Neurological: Neurological exam unremarkable.   Vitals reviewed.    Data Review:   Lab Results   Component Value Date    GLUCOSE 91 05/22/2020    BUN 27 (H) 05/22/2020    CREATININE 1.45 (H) 05/22/2020    EGFRIFNONA 35 (L) 05/22/2020    BCR 18.6 05/22/2020    K 3.9 05/22/2020    CO2 21.0 (L) 05/22/2020    CALCIUM 8.0 (L) 05/22/2020    ALBUMIN 3.90 05/21/2020    AST 12 05/21/2020    ALT 8 05/21/2020     Lab Results   Component Value Date    CHOL 141 05/22/2020    TRIG 100 05/22/2020    HDL 56 05/22/2020    LDL 65 05/22/2020      Lab Results   Component Value Date    WBC 8.19 05/21/2020    RBC 4.05 05/21/2020    HGB 11.7 (L) " 05/21/2020    HCT 36.8 05/21/2020    MCV 90.9 05/21/2020     05/21/2020     No results found for: TSH  Lab Results   Component Value Date    HGBA1C 5.70 (H) 05/22/2020        Procedures           Assessment:      Diagnosis Plan   1. Coronary artery disease involving native coronary artery of native heart without angina pectoris  Stable without current angina.    2. Orthostasis  Orthostatic hypotension and dizziness. Decrease metoprolol to 25mg BID and chlorthalidone to 12.5mg daily. We will further decrease if needed.   3. Essential hypertension  Now with low normal blood pressures.    4. Mixed hyperlipidemia  LDL at goal. Continue statin therapy.        Plan:   Orthostatic hypotension and dizziness. Decrease metoprolol to 25mg BID and chlorthalidone to 12.5mg daily. We will further decrease if needed.   Continue all other current medications.   FU in 6 MO, sooner as needed.  Thank you for allowing us to participate in the care of your patient.     Brenna Longo PA-C      Please note that portions of this note may have been completed with a voice recognition program. Efforts were made to edit the dictations, but occasionally words are mistranscribed.